# Patient Record
Sex: FEMALE | Race: OTHER | HISPANIC OR LATINO | URBAN - METROPOLITAN AREA
[De-identification: names, ages, dates, MRNs, and addresses within clinical notes are randomized per-mention and may not be internally consistent; named-entity substitution may affect disease eponyms.]

---

## 2021-08-01 ENCOUNTER — EMERGENCY (EMERGENCY)
Facility: HOSPITAL | Age: 21
LOS: 1 days | Discharge: HOME | End: 2021-08-01
Attending: STUDENT IN AN ORGANIZED HEALTH CARE EDUCATION/TRAINING PROGRAM | Admitting: STUDENT IN AN ORGANIZED HEALTH CARE EDUCATION/TRAINING PROGRAM
Payer: MEDICAID

## 2021-08-01 VITALS
OXYGEN SATURATION: 100 % | RESPIRATION RATE: 17 BRPM | DIASTOLIC BLOOD PRESSURE: 73 MMHG | SYSTOLIC BLOOD PRESSURE: 112 MMHG | HEART RATE: 86 BPM | TEMPERATURE: 98 F

## 2021-08-01 VITALS
DIASTOLIC BLOOD PRESSURE: 78 MMHG | HEART RATE: 81 BPM | TEMPERATURE: 98 F | OXYGEN SATURATION: 100 % | SYSTOLIC BLOOD PRESSURE: 118 MMHG | RESPIRATION RATE: 18 BRPM

## 2021-08-01 LAB
ALBUMIN SERPL ELPH-MCNC: 4.5 G/DL — SIGNIFICANT CHANGE UP (ref 3.3–5)
ALP SERPL-CCNC: 99 U/L — SIGNIFICANT CHANGE UP (ref 40–120)
ALT FLD-CCNC: 21 U/L — SIGNIFICANT CHANGE UP (ref 4–33)
ANION GAP SERPL CALC-SCNC: 14 MMOL/L — SIGNIFICANT CHANGE UP (ref 7–14)
AST SERPL-CCNC: 19 U/L — SIGNIFICANT CHANGE UP (ref 4–32)
BASOPHILS # BLD AUTO: 0.04 K/UL — SIGNIFICANT CHANGE UP (ref 0–0.2)
BASOPHILS NFR BLD AUTO: 0.5 % — SIGNIFICANT CHANGE UP (ref 0–2)
BILIRUB SERPL-MCNC: 0.3 MG/DL — SIGNIFICANT CHANGE UP (ref 0.2–1.2)
BUN SERPL-MCNC: 12 MG/DL — SIGNIFICANT CHANGE UP (ref 7–23)
CALCIUM SERPL-MCNC: 9.8 MG/DL — SIGNIFICANT CHANGE UP (ref 8.4–10.5)
CHLORIDE SERPL-SCNC: 102 MMOL/L — SIGNIFICANT CHANGE UP (ref 98–107)
CO2 SERPL-SCNC: 23 MMOL/L — SIGNIFICANT CHANGE UP (ref 22–31)
CREAT SERPL-MCNC: 0.6 MG/DL — SIGNIFICANT CHANGE UP (ref 0.5–1.3)
EOSINOPHIL # BLD AUTO: 0 K/UL — SIGNIFICANT CHANGE UP (ref 0–0.5)
EOSINOPHIL NFR BLD AUTO: 0 % — SIGNIFICANT CHANGE UP (ref 0–6)
GLUCOSE SERPL-MCNC: 104 MG/DL — HIGH (ref 70–99)
HCG SERPL-ACNC: <5 MIU/ML — SIGNIFICANT CHANGE UP
HCT VFR BLD CALC: 41.9 % — SIGNIFICANT CHANGE UP (ref 34.5–45)
HGB BLD-MCNC: 13.9 G/DL — SIGNIFICANT CHANGE UP (ref 11.5–15.5)
IANC: 6.96 K/UL — SIGNIFICANT CHANGE UP (ref 1.5–8.5)
IMM GRANULOCYTES NFR BLD AUTO: 0.6 % — SIGNIFICANT CHANGE UP (ref 0–1.5)
LIDOCAIN IGE QN: 22 U/L — SIGNIFICANT CHANGE UP (ref 7–60)
LYMPHOCYTES # BLD AUTO: 1.39 K/UL — SIGNIFICANT CHANGE UP (ref 1–3.3)
LYMPHOCYTES # BLD AUTO: 15.9 % — SIGNIFICANT CHANGE UP (ref 13–44)
MCHC RBC-ENTMCNC: 29.6 PG — SIGNIFICANT CHANGE UP (ref 27–34)
MCHC RBC-ENTMCNC: 33.2 GM/DL — SIGNIFICANT CHANGE UP (ref 32–36)
MCV RBC AUTO: 89.3 FL — SIGNIFICANT CHANGE UP (ref 80–100)
MONOCYTES # BLD AUTO: 0.28 K/UL — SIGNIFICANT CHANGE UP (ref 0–0.9)
MONOCYTES NFR BLD AUTO: 3.2 % — SIGNIFICANT CHANGE UP (ref 2–14)
NEUTROPHILS # BLD AUTO: 6.96 K/UL — SIGNIFICANT CHANGE UP (ref 1.8–7.4)
NEUTROPHILS NFR BLD AUTO: 79.8 % — HIGH (ref 43–77)
NRBC # BLD: 0 /100 WBCS — SIGNIFICANT CHANGE UP
NRBC # FLD: 0 K/UL — SIGNIFICANT CHANGE UP
PLATELET # BLD AUTO: 186 K/UL — SIGNIFICANT CHANGE UP (ref 150–400)
POTASSIUM SERPL-MCNC: 3.8 MMOL/L — SIGNIFICANT CHANGE UP (ref 3.5–5.3)
POTASSIUM SERPL-SCNC: 3.8 MMOL/L — SIGNIFICANT CHANGE UP (ref 3.5–5.3)
PROT SERPL-MCNC: 7.8 G/DL — SIGNIFICANT CHANGE UP (ref 6–8.3)
RBC # BLD: 4.69 M/UL — SIGNIFICANT CHANGE UP (ref 3.8–5.2)
RBC # FLD: 11.9 % — SIGNIFICANT CHANGE UP (ref 10.3–14.5)
SODIUM SERPL-SCNC: 139 MMOL/L — SIGNIFICANT CHANGE UP (ref 135–145)
WBC # BLD: 8.72 K/UL — SIGNIFICANT CHANGE UP (ref 3.8–10.5)
WBC # FLD AUTO: 8.72 K/UL — SIGNIFICANT CHANGE UP (ref 3.8–10.5)

## 2021-08-01 PROCEDURE — 99284 EMERGENCY DEPT VISIT MOD MDM: CPT

## 2021-08-01 RX ORDER — ONDANSETRON 8 MG/1
4 TABLET, FILM COATED ORAL ONCE
Refills: 0 | Status: COMPLETED | OUTPATIENT
Start: 2021-08-01 | End: 2021-08-01

## 2021-08-01 RX ORDER — FAMOTIDINE 10 MG/ML
20 INJECTION INTRAVENOUS ONCE
Refills: 0 | Status: COMPLETED | OUTPATIENT
Start: 2021-08-01 | End: 2021-08-01

## 2021-08-01 RX ORDER — SODIUM CHLORIDE 9 MG/ML
2000 INJECTION INTRAMUSCULAR; INTRAVENOUS; SUBCUTANEOUS ONCE
Refills: 0 | Status: COMPLETED | OUTPATIENT
Start: 2021-08-01 | End: 2021-08-01

## 2021-08-01 RX ADMIN — FAMOTIDINE 20 MILLIGRAM(S): 10 INJECTION INTRAVENOUS at 12:38

## 2021-08-01 RX ADMIN — ONDANSETRON 4 MILLIGRAM(S): 8 TABLET, FILM COATED ORAL at 12:38

## 2021-08-01 RX ADMIN — SODIUM CHLORIDE 2000 MILLILITER(S): 9 INJECTION INTRAMUSCULAR; INTRAVENOUS; SUBCUTANEOUS at 12:39

## 2021-08-01 NOTE — ED ADULT NURSE NOTE - OBJECTIVE STATEMENT
22 y/o female arrives to E.D. intake area c/o N/V since this AM, pt endorses drinking 4 beers and sangria last night. Pt states she vomitted daya 4 times since this AM. Pt denies chronic alcohol use. Pt a&ox4, ambulatory, neg sob, denies chest pain, denies fevers/cough. R 18g IV placed to AC. Medicated as per eMAR.

## 2021-08-01 NOTE — ED PROVIDER NOTE - PROGRESS NOTE DETAILS
Shameem: Patient give 2L fluids, zofran and pepcid. Feels much better. No n/v at this time. CBC, cmp, lipase unremarkable. Will give PO challenge, if pt can tolerate, stable for discharge. Shameem: Patient vitals stable, PO challenge passed. Stable for dc. Return precautions verbalized. Will follow w/ PMD routinely. Patient agrees with plan.

## 2021-08-01 NOTE — ED PROVIDER NOTE - ATTENDING CONTRIBUTION TO CARE
Doug Tinsley DO:  patient seen and evaluated with the resident.  I was present for key portions of the History & Physical, and I agree with the Impression & Plan. 22 yo f no reported pmh, pw n/v. pt reports excessive etoh intake last night. awoke this morning w/ multiple episodes non-bloody, non-bilious emesis. arrives hds, actively retching. abd soft. no hx abd surgeries. no f/c. passing gas. no urinary sx. low suspicion pancreatitis. likely etoh induced gastritis. will check labs, treat sx, reassess.

## 2021-08-01 NOTE — ED PROVIDER NOTE - OBJECTIVE STATEMENT
Patient is 20 y/o F with no significant PMH who presents to the ED with episodes of nausea and vomiting that began this AM. Patient states last night she went out drinking. Had a variety of drinks including Sangria, beer and soju. Estimated 4 red solo cups of alcohol beverages. When woke up 6 AM had 4-5 episodes of non-bloody, non-bilious emesis. No cough, SOB, abdominal pain or back pain. Denies chest pain or palpitations. Last meal yesterday night. Patient denies binge drinking or chronic alcohol use.

## 2021-08-01 NOTE — ED PROVIDER NOTE - CLINICAL SUMMARY MEDICAL DECISION MAKING FREE TEXT BOX
Patient presenting with episodes of nausea and vomiting since this AM. No chest pain or abdominal pain. No hematemesis. Vitals stable, physical exam without concerning findings. This is likely alcohol use-induced vomiting/velsalgia. Low suspicion for acute pancreatitis. No concern for sequela of repeated vomiting ex. leandro caba tear. Workup: cbc, cmp, lipase, ekg, bhcg. IVF, pecid and zofran for supportive care.

## 2021-08-01 NOTE — ED ADULT NURSE NOTE - NSIMPLEMENTINTERV_GEN_ALL_ED
Implemented All Universal Safety Interventions:  [place] to call system. Call bell, personal items and telephone within reach. Instruct patient to call for assistance. Room bathroom lighting operational. Non-slip footwear when patient is off stretcher. Physically safe environment: no spills, clutter or unnecessary equipment. Stretcher in lowest position, wheels locked, appropriate side rails in place.

## 2021-08-01 NOTE — ED PROVIDER NOTE - NS ED ROS FT
CONSTITUTIONAL: No fevers, no chills, no lightheadedness, no dizziness  EYES: no visual changes, no eye pain  EARS: no ear drainage, no ear pain, no change in hearing  NOSE: no nasal congestion  MOUTH/THROAT: no sore throat  CV: No chest pain, no palpitations  RESP: No SOB, no cough  GI: see hpi   : no dysuria, no hematuria, no flank pain  MSK: no back pain, no extremity pain  SKIN: no rashes  NEURO: no headache, no focal weakness, no decreased sensation/parasthesias   PSYCHIATRIC: no known mental health issues

## 2021-08-01 NOTE — ED PROVIDER NOTE - NSFOLLOWUPINSTRUCTIONS_ED_ALL_ED_FT
You were seen in the Emergency Department today for nausea and vomiting. You were given intravenous fluids, Pepcid and Zofran for your symptoms. Blood work was obtained. Results were discussed with you and a copy will be provided to you. Information regarding nausea and vomiting is provided below. Follow with your primary care provider at your earliest convenience. Take your home medication as prescribed.     Return to the emergency department if:  You see blood in your vomit or your bowel movements.  You have sudden, severe pain in your chest and upper abdomen after hard vomiting or retching.  You have swelling in your neck and chest.  You are dizzy, cold, and thirsty and your eyes and mouth are dry.  You are urinating very little or not at all.  You have muscle weakness, leg cramps, and trouble breathing.  Your heart is beating much faster than normal.  You continue to vomit for more than 48 hours.  Contact your healthcare provider if:  You have frequent dry heaves (vomiting but nothing comes out).  Your nausea and vomiting does not get better or go away after you use medicine.  You have questions or concerns about your condition or treatment.      WHAT YOU NEED TO KNOW:  Acute nausea and vomiting start suddenly, worsen quickly, and last a short time.    DISCHARGE INSTRUCTIONS:    Medicines: You may need any of the following:  Medicines may be given to calm your stomach and stop your vomiting. You may also need medicines to help you feel more relaxed or to stop nausea and vomiting caused by motion sickness.  Gastrointestinal stimulants are used to help empty your stomach and bowels. This may help decrease nausea and vomiting.    Take your medicine as directed. Contact your healthcare provider if you think your medicine is not helping or if you have side effects. Tell him or her if you are allergic to any medicine. Keep a list of the medicines, vitamins, and herbs you take. Include the amounts, and when and why you take them. Bring the list or the pill bottles to follow-up visits. Carry your medicine list with you in case of an emergency.  Prevent or manage acute nausea and vomiting:  Do not drink alcohol. Alcohol may upset or irritate your stomach. Too much alcohol can also cause acute nausea and vomiting.  Control stress. Headaches due to stress may cause nausea and vomiting. Find ways to relax and manage your stress. Get more rest and sleep.  Drink more liquids as directed. Vomiting can lead to dehydration. It is important to drink more liquids to help replace lost body fluids. Ask your healthcare provider how much liquid to drink each day and which liquids are best for you. Your provider may recommend that you drink an oral rehydration solution (ORS). ORS contains water, salts, and sugar that are needed to replace the lost body fluids. Ask what kind of ORS to use, how much to drink, and where to get it.  Eat smaller meals, more often. Eat small amounts of food every 2 to 3 hours, even if you are not hungry. Food in your stomach may decrease your nausea.  Talk to your healthcare provider before you take over-the-counter (OTC) medicines. These medicines can cause serious problems if you use certain other medicines, or you have a medical condition. You may have problems if you use too much or use them for longer than the label says. Follow directions on the label carefully.    Follow up with your healthcare provider as directed: Write down your questions so you remember to ask them during your follow-up visits.

## 2021-08-01 NOTE — ED PROVIDER NOTE - PATIENT PORTAL LINK FT
You can access the FollowMyHealth Patient Portal offered by [facility] by registering at the following website: [email or web address]. By joining [employer] [employer]’s FollowMyHealth portal, you will also be able to view your health information using other applications (apps) compatible with our system.

## 2021-08-01 NOTE — ED PROVIDER NOTE - PHYSICAL EXAMINATION
General: Alert and Orientated x 3. No apparent distress, with emesis bag.   Head: Normocephalic and atraumatic.  Eyes: PERRLA with EOMI.   Neck: Supple. Trachea midline. no crepitus.   Cardiac: Normal S1 and S2 w/ RRR. No murmurs appreciated. No JVD appreciated.  Pulmonary: Vesicular breath sounds bilaterally. No increased WOB. No wheezes or crackles.  Abdominal: Soft, non-tender. (+) bowel sounds appreciated in all 4 quadrants. No hepatosplenomegaly.   Neurologic: No focal sensory or motor deficits. No ataxia, gait normal. Sensation intact. Normal finger to nose and shine to heel. CN 2-12 grossly intact. no tremors.   Musculoskeletal: Strength appropriate in all 4 extremities for age with no limited ROM.  Skin: Color appropriate for race. Intact, warm, and well-perfused.  Psychiatric: Appropriate mood and affect. No apparent risk to self or others.

## 2021-08-20 ENCOUNTER — EMERGENCY (EMERGENCY)
Facility: HOSPITAL | Age: 21
LOS: 1 days | Discharge: HOME | End: 2021-08-20
Attending: EMERGENCY MEDICINE | Admitting: EMERGENCY MEDICINE
Payer: MEDICAID

## 2021-08-20 VITALS
OXYGEN SATURATION: 99 % | TEMPERATURE: 98 F | HEART RATE: 67 BPM | SYSTOLIC BLOOD PRESSURE: 131 MMHG | RESPIRATION RATE: 18 BRPM | DIASTOLIC BLOOD PRESSURE: 70 MMHG

## 2021-08-20 PROBLEM — Z78.9 OTHER SPECIFIED HEALTH STATUS: Chronic | Status: ACTIVE | Noted: 2021-08-01

## 2021-08-20 LAB
ALBUMIN SERPL ELPH-MCNC: 4.2 G/DL — SIGNIFICANT CHANGE UP (ref 3.3–5)
ALP SERPL-CCNC: 81 U/L — SIGNIFICANT CHANGE UP (ref 40–120)
ALT FLD-CCNC: 12 U/L — SIGNIFICANT CHANGE UP (ref 4–33)
ANION GAP SERPL CALC-SCNC: 14 MMOL/L — SIGNIFICANT CHANGE UP (ref 7–14)
AST SERPL-CCNC: 16 U/L — SIGNIFICANT CHANGE UP (ref 4–32)
BASOPHILS # BLD AUTO: 0.05 K/UL — SIGNIFICANT CHANGE UP (ref 0–0.2)
BASOPHILS NFR BLD AUTO: 0.4 % — SIGNIFICANT CHANGE UP (ref 0–2)
BILIRUB SERPL-MCNC: 0.2 MG/DL — SIGNIFICANT CHANGE UP (ref 0.2–1.2)
BUN SERPL-MCNC: 11 MG/DL — SIGNIFICANT CHANGE UP (ref 7–23)
CALCIUM SERPL-MCNC: 9.5 MG/DL — SIGNIFICANT CHANGE UP (ref 8.4–10.5)
CHLORIDE SERPL-SCNC: 106 MMOL/L — SIGNIFICANT CHANGE UP (ref 98–107)
CO2 SERPL-SCNC: 20 MMOL/L — LOW (ref 22–31)
CREAT SERPL-MCNC: 0.63 MG/DL — SIGNIFICANT CHANGE UP (ref 0.5–1.3)
EOSINOPHIL # BLD AUTO: 0.04 K/UL — SIGNIFICANT CHANGE UP (ref 0–0.5)
EOSINOPHIL NFR BLD AUTO: 0.3 % — SIGNIFICANT CHANGE UP (ref 0–6)
GLUCOSE SERPL-MCNC: 113 MG/DL — HIGH (ref 70–99)
HCT VFR BLD CALC: 40.5 % — SIGNIFICANT CHANGE UP (ref 34.5–45)
HGB BLD-MCNC: 13.9 G/DL — SIGNIFICANT CHANGE UP (ref 11.5–15.5)
IANC: 9.52 K/UL — HIGH (ref 1.5–8.5)
IMM GRANULOCYTES NFR BLD AUTO: 0.5 % — SIGNIFICANT CHANGE UP (ref 0–1.5)
LYMPHOCYTES # BLD AUTO: 1.34 K/UL — SIGNIFICANT CHANGE UP (ref 1–3.3)
LYMPHOCYTES # BLD AUTO: 11.6 % — LOW (ref 13–44)
MCHC RBC-ENTMCNC: 29.7 PG — SIGNIFICANT CHANGE UP (ref 27–34)
MCHC RBC-ENTMCNC: 34.3 GM/DL — SIGNIFICANT CHANGE UP (ref 32–36)
MCV RBC AUTO: 86.5 FL — SIGNIFICANT CHANGE UP (ref 80–100)
MONOCYTES # BLD AUTO: 0.54 K/UL — SIGNIFICANT CHANGE UP (ref 0–0.9)
MONOCYTES NFR BLD AUTO: 4.7 % — SIGNIFICANT CHANGE UP (ref 2–14)
NEUTROPHILS # BLD AUTO: 9.52 K/UL — HIGH (ref 1.8–7.4)
NEUTROPHILS NFR BLD AUTO: 82.5 % — HIGH (ref 43–77)
NRBC # BLD: 0 /100 WBCS — SIGNIFICANT CHANGE UP
NRBC # FLD: 0 K/UL — SIGNIFICANT CHANGE UP
PLATELET # BLD AUTO: 178 K/UL — SIGNIFICANT CHANGE UP (ref 150–400)
POTASSIUM SERPL-MCNC: 3.9 MMOL/L — SIGNIFICANT CHANGE UP (ref 3.5–5.3)
POTASSIUM SERPL-SCNC: 3.9 MMOL/L — SIGNIFICANT CHANGE UP (ref 3.5–5.3)
PROT SERPL-MCNC: 7.2 G/DL — SIGNIFICANT CHANGE UP (ref 6–8.3)
RBC # BLD: 4.68 M/UL — SIGNIFICANT CHANGE UP (ref 3.8–5.2)
RBC # FLD: 11.7 % — SIGNIFICANT CHANGE UP (ref 10.3–14.5)
SODIUM SERPL-SCNC: 140 MMOL/L — SIGNIFICANT CHANGE UP (ref 135–145)
WBC # BLD: 11.55 K/UL — HIGH (ref 3.8–10.5)
WBC # FLD AUTO: 11.55 K/UL — HIGH (ref 3.8–10.5)

## 2021-08-20 PROCEDURE — 99284 EMERGENCY DEPT VISIT MOD MDM: CPT

## 2021-08-20 RX ORDER — ONDANSETRON 8 MG/1
1 TABLET, FILM COATED ORAL
Qty: 15 | Refills: 0
Start: 2021-08-20 | End: 2021-08-24

## 2021-08-20 RX ORDER — ACETAMINOPHEN 500 MG
650 TABLET ORAL ONCE
Refills: 0 | Status: COMPLETED | OUTPATIENT
Start: 2021-08-20 | End: 2021-08-20

## 2021-08-20 RX ORDER — SODIUM CHLORIDE 9 MG/ML
1000 INJECTION INTRAMUSCULAR; INTRAVENOUS; SUBCUTANEOUS ONCE
Refills: 0 | Status: COMPLETED | OUTPATIENT
Start: 2021-08-20 | End: 2021-08-20

## 2021-08-20 RX ORDER — ONDANSETRON 8 MG/1
4 TABLET, FILM COATED ORAL ONCE
Refills: 0 | Status: COMPLETED | OUTPATIENT
Start: 2021-08-20 | End: 2021-08-20

## 2021-08-20 RX ADMIN — ONDANSETRON 4 MILLIGRAM(S): 8 TABLET, FILM COATED ORAL at 15:22

## 2021-08-20 RX ADMIN — Medication 650 MILLIGRAM(S): at 15:27

## 2021-08-20 RX ADMIN — SODIUM CHLORIDE 1000 MILLILITER(S): 9 INJECTION INTRAMUSCULAR; INTRAVENOUS; SUBCUTANEOUS at 15:22

## 2021-08-20 NOTE — ED ADULT NURSE NOTE - OBJECTIVE STATEMENT
Pt presents to intake rm 8, alery&orientedx4, ambulatory, denies PMHX coming to ED for nausea and vomiting that started today. Pt states it was her second day using "period cup", left over night and woke up started vomiting and endorsing abd pain. NAD noted, no abnormal heaving bleeding, breathing non-labored. 20g IV established on right ac, labs drawn and sent, medications given as ordered.

## 2021-08-20 NOTE — ED PROVIDER NOTE - PHYSICAL EXAMINATION
Gen: NAD, AOx3, able to make needs known, non-toxic  Head: NCAT  HEENT: EOMI, normal conjunctiva  Lung: CTAB, no respiratory distress, no wheezes/rhonchi/rales B/L, speaking in full sentences  CV: RRR, no M/R/G, pulses bilaterally   Abd: soft, NTND, no guarding, no CVA tenderness  MSK: no visible deformities  Pelvic:   Neuro: No focal sensory or motor deficits  Skin: Warm, well perfused, no rash  Psych: normal affect Gen: NAD, AOx3, able to make needs known, non-toxic  Head: NCAT  HEENT: EOMI, normal conjunctiva  Lung: CTAB, no respiratory distress, no wheezes/rhonchi/rales B/L, speaking in full sentences  CV: RRR, no M/R/G, pulses bilaterally   Abd: soft, NTND, no guarding, no CVA tenderness  MSK: no visible deformities  Neuro: No focal sensory or motor deficits  Skin: Warm, well perfused, no rash  Psych: normal affect

## 2021-08-20 NOTE — ED ADULT NURSE NOTE - NSIMPLEMENTINTERV_GEN_ALL_ED
Implemented All Fall Risk Interventions:  [place] to call system. Call bell, personal items and telephone within reach. Instruct patient to call for assistance. Room bathroom lighting operational. Non-slip footwear when patient is off stretcher. Physically safe environment: no spills, clutter or unnecessary equipment. Stretcher in lowest position, wheels locked, appropriate side rails in place. Provide visual cue, wrist band, yellow gown, etc. Monitor gait and stability. Monitor for mental status changes and reorient to person, place, and time. Review medications for side effects contributing to fall risk. Reinforce activity limits and safety measures with patient and family.

## 2021-08-20 NOTE — ED PROVIDER NOTE - PATIENT PORTAL LINK FT
You can access the FollowMyHealth Patient Portal offered by [facility] by registering at the following website: [email or web address]. By joining [employer]’s FollowMyHealth portal, you will also be able to view your health information using other applications (apps) compatible with our system.

## 2021-08-20 NOTE — ED PROVIDER NOTE - OBJECTIVE STATEMENT
20yo woman with no PMH presenting with vomitingx3 and diarrhea 2 hours ago. Patient used period ring for the first time last night, awoke this morning and changed it after having it on for ~15 hours. At noon all of a sudden started vomiting and having diarrhea at the same time. Patient states she has some abdominal pain and feels weak in her legs. Denies fever, recent sickness. 22yo woman with no PMH presenting with vomitingx3 and diarrhea 2 hours ago. Patient used period ring for the first time last night, awoke this morning and changed it after having it on for ~15 hours. Felt nauseas and took the period ring out. At noon all of a sudden started vomiting and having diarrhea at the same time. Patient states she has some abdominal pain and feels weak in her legs. Denies sexual intercourse, any foreign bodies currently still in the vagina. Denies fever, recent sickness. 22yo woman with no PMH presenting with vomitingx3 and diarrhea 2 hours ago. Patient used period ring for the first time last night, awoke this morning and changed it after having it on for ~15 hours. Felt nauseas and took the period ring out (put in 2, took out 2). At noon all of a sudden started vomiting and having diarrhea at the same time. Patient states she has some abdominal pain and feels weak in her legs. Denies sexual intercourse, any foreign bodies currently still in the vagina. Denies fever, recent sickness.

## 2021-08-20 NOTE — ED PROVIDER NOTE - ATTENDING CONTRIBUTION TO CARE
DR. LEACH, ATTENDING MD-  I performed a face to face bedside interview with the patient regarding history of present illness, review of symptoms and past medical history. I completed an independent physical exam.  I have discussed the patient's plan of care with the resident.   Documentation as above in the note.    20 y/o female with n/v/d x1 hour pta.  No mj use.  States never sexually active.  Used a "period ring" twice for the first time during this menstrual period.  Once last night and once this morning.  States she placed two and retrieved two.  Likely viral syndrome vs foodborne illness.  Eval for pregnancy, lyte abn.  Obtain ucg cbc cmp give ivf antiemetic reassess, will need to pass po trial prior to dc.

## 2021-08-20 NOTE — ED PROVIDER NOTE - CLINICAL SUMMARY MEDICAL DECISION MAKING FREE TEXT BOX
Tyler, PGY1 - 20yo woman with vomiting and diarrhea at the same time. CBC, CMP, Zofran. Suspicious for gastroenteritis. Tyler, PGY1 - 20yo woman with vomiting and diarrhea at the same time. CBC, CMP, Zofran. Suspicious for gastroenteritis. [facility], decreased suspicion for [facility] given sexual history. Tyler, PGY1 - 20yo woman with vomiting and diarrhea at the same time. CBC, CMP, Zofran. Suspicious for gastroenteritis. Will get POC hcg, decreased suspicion for pregnancy given sexual history.

## 2021-08-20 NOTE — ED ADULT NURSE NOTE - CHIEF COMPLAINT QUOTE
Patient brought to ER by her brother because she had a period ring in for more than the 12 hour period. (Overnight) and an hour ago she started violently vomiting and diarrhea.  Brother Duarte: [phone]

## 2021-08-20 NOTE — ED ADULT TRIAGE NOTE - CHIEF COMPLAINT QUOTE
Patient brought to ER by her brother because she had a period ring in for more than the 12 hour period. (Overnight) and an hour ago she started violently vomiting and diarrhea. Patient brought to ER by her brother because she had a period ring in for more than the 12 hour period. (Overnight) and an hour ago she started violently vomiting and diarrhea.  Brother Duarte: [phone]

## 2021-08-20 NOTE — ED PROVIDER NOTE - NS ED ROS FT
GENERAL: No fever, no chills  EYES: No change in vision  HEENT: No trouble swallowing or speaking  CARDIAC: No chest pain  PULMONARY: No cough, no SOB  GI: +abdominal pain, no nausea, +vomiting, no diarrhea, no constipation  : No changes in urination  SKIN: No rashes  NEURO: No headache, no numbness  MSK: No joint pain  Otherwise as HPI or negative.

## 2021-08-20 NOTE — ED PROVIDER NOTE - NSFOLLOWUPINSTRUCTIONS_ED_ALL_ED_FT
You were seen in the Emergency Room today because of vomiting and diarrhea. Your blood tests showed a small increase in your white blood cells, which can be due to inflammation or infection. These results, and your improvement in symptoms with pain medicine and anti-nausea medication is reassuring that this is due to a viral syndrome of your intestines.     Your lab and imaging results are included in your discharge paperwork. Please follow-up with your Primary Care Physician for further management of your symptoms.     We have sent medication to your pharmacy. It is called "[employer]an. Please take one pill -----.       ----------    Gastroenteritis    WHAT YOU NEED TO KNOW:  Gastroenteritis, or stomach flu, is an infection of the stomach and intestines.     DISCHARGE INSTRUCTIONS:    Please come back to the Emergency Room if:   •You have trouble breathing or a very fast pulse.  •You see blood in your diarrhea.  •You cannot stop vomiting.  •You have not urinated for 12 hours.   •You faint.     Contact your healthcare provider if:   •You have a fever.  •You continue to vomit or have diarrhea, even after treatment.  •You see worms in your diarrhea.  •Your mouth or eyes are dry. You are not urinating as much or as often.  •You have questions or concerns about your condition or care.      Medicines:   •Medicines may be given to stop vomiting or diarrhea, decrease abdominal cramps, or treat an infection.  •Take your medicine as directed. Contact your healthcare provider if you think your medicine is not helping or if you have side effects. Tell him or her if you are allergic to any medicine. Keep a list of the medicines, vitamins, and herbs you take. Include the amounts, and when and why you take them. Bring the list or the pill bottles to follow-up visits. Carry your medicine list with you in case of an emergency.      Manage your symptoms:   •Drink liquids as directed. Ask your healthcare provider how much liquid to drink each day, and which liquids are best for you. You may also need to drink an oral rehydration solution (ORS). An ORS has the right amounts of sugar, salt, and minerals in water to replace body fluids.  •Eat bland foods. When you feel hungry, begin eating soft, bland foods. Examples are bananas, clear soup, potatoes, and applesauce. Do not have dairy products, alcohol, sugary drinks, or drinks with caffeine until you feel better.  •Rest as much as possible. Slowly start to do more each day when you begin to feel better.      Prevent the spread of gastroenteritis: Gastroenteritis can spread easily. Keep yourself, your family, and your surroundings clean to help prevent the spread of gastroenteritis:   •Wash your hands often. Use soap and water. Wash your hands after you use the bathroom, change a child's diapers, or sneeze. Wash your hands before you prepare or eat food.   •Clean surfaces and do laundry often. Wash your clothes and towels separately from the rest of the laundry. Clean surfaces in your home with antibacterial  or bleach.  •Clean food thoroughly and cook safely. Wash raw vegetables before you cook. Cook meat, fish, and eggs fully. Do not use the same dishes for raw meat as you do for other foods. Refrigerate any leftover food immediately.  •Be aware when you camp or travel. Drink only clean water. Do not drink from rivers or lakes unless you purify or boil the water first. When you travel, drink bottled water and do not add ice. Do not eat fruit that has not been peeled. Do not eat raw fish or meat that is not fully cooked.       Follow up with your doctor as directed: Write down your questions so you remember to ask them during your visits. You were seen in the Emergency Room today because of vomiting and diarrhea. Your blood tests showed a small increase in your white blood cells, which can be due to inflammation or infection. These results, and your improvement in symptoms with pain medicine and anti-nausea medication is reassuring that this is due to a viral syndrome of your intestines.     Your lab and imaging results are included in your discharge paperwork. Please follow-up with your Primary Care Physician for further management of your symptoms.     We have sent medication to your pharmacy. It is called [employer]. Please take one pill every 8 hours for 5 days.       ----------    Gastroenteritis    WHAT YOU NEED TO KNOW:  Gastroenteritis, or stomach flu, is an infection of the stomach and intestines.     DISCHARGE INSTRUCTIONS:    Please come back to the Emergency Room if:   •You have trouble breathing or a very fast pulse.  •You see blood in your diarrhea.  •You cannot stop vomiting.  •You have not urinated for 12 hours.   •You faint.     Contact your healthcare provider if:   •You have a fever.  •You continue to vomit or have diarrhea, even after treatment.  •You see worms in your diarrhea.  •Your mouth or eyes are dry. You are not urinating as much or as often.  •You have questions or concerns about your condition or care.      Medicines:   •Take your medicine as directed. Contact your healthcare provider if you think your medicine is not helping or if you have side effects. Tell him or her if you are allergic to any medicine.       Manage your symptoms:   •Drink a lot of liquid during the day.   •Eat bland foods. When you feel hungry, begin eating soft, bland foods. Examples are bananas, clear soup, potatoes, and applesauce. Do not have dairy products, alcohol, sugary drinks, or drinks with caffeine until you feel better.  •Rest as much as possible. Slowly start to do more each day when you begin to feel better.      Prevent the spread of gastroenteritis: Gastroenteritis can spread easily. Keep yourself, your family, and your surroundings clean to help prevent the spread of gastroenteritis:   •Wash your hands often. Use soap and water.   •Clean surfaces and do laundry often.   •Clean food thoroughly and cook safely. Wash raw vegetables before you cook. Cook meat, fish, and eggs fully. Do not use the same dishes for raw meat as you do for other foods. Refrigerate any leftover food immediately.  •Be aware when you camp or travel. Drink only clean water. Do not drink from rivers or lakes unless you purify or boil the water first. When you travel, drink bottled water and do not add ice. Do not eat fruit that has not been peeled. Do not eat raw fish or meat that is not fully cooked.       Follow up with your doctor as directed: Write down your questions so you remember to ask them during your visits. You were seen in the Emergency Room today because of vomiting and diarrhea. Your blood tests showed a small increase in your white blood cells, which can be due to inflammation or infection. These results, and your improvement in symptoms with pain medicine and anti-nausea medication is reassuring that this is due to a viral syndrome of your intestines.     Your lab and imaging results are included in your discharge paperwork. Please follow-up with your Primary Care Physician for further management of your symptoms if they continue.     We have sent medication to your pharmacy. It is called Zofran. Please take one pill every 8 hours if you need to for nausea.        ----------    Gastroenteritis    WHAT YOU NEED TO KNOW:  Gastroenteritis, or stomach flu, is an infection of the stomach and intestines.     DISCHARGE INSTRUCTIONS:    Please come back to the Emergency Room if:   •You have trouble breathing or a very fast pulse.  •You see blood in your diarrhea.  •You cannot stop vomiting.  •You have not urinated for 12 hours.   •You faint.     Contact your healthcare provider if:   •You have a fever.  •You continue to vomit or have diarrhea, even after treatment.  •You see worms in your diarrhea.  •Your mouth or eyes are dry. You are not urinating as much or as often.  •You have questions or concerns about your condition or care.      Medicines:   •Take your medicine as directed. Contact your healthcare provider if you think your medicine is not helping or if you have side effects. Tell him or her if you are allergic to any medicine.       Manage your symptoms:   •Drink a lot of liquid during the day.   •Eat bland foods. When you feel hungry, begin eating soft, bland foods. Examples are bananas, clear soup, potatoes, and applesauce. Do not have dairy products, alcohol, sugary drinks, or drinks with caffeine until you feel better.  •Rest as much as possible. Slowly start to do more each day when you begin to feel better.      Prevent the spread of gastroenteritis: Gastroenteritis can spread easily. Keep yourself, your family, and your surroundings clean to help prevent the spread of gastroenteritis:   •Wash your hands often. Use soap and water.   •Clean surfaces and do laundry often.   •Clean food thoroughly and cook safely. Wash raw vegetables before you cook. Cook meat, fish, and eggs fully. Do not use the same dishes for raw meat as you do for other foods. Refrigerate any leftover food immediately.  •Be aware when you camp or travel. Drink only clean water. Do not drink from rivers or lakes unless you purify or boil the water first. When you travel, drink bottled water and do not add ice. Do not eat fruit that has not been peeled. Do not eat raw fish or meat that is not fully cooked.       Follow up with your doctor as directed: Write down your questions so you remember to ask them during your visits.

## 2021-08-20 NOTE — ED PROVIDER NOTE - PROGRESS NOTE DETAILS
Tyler, PGY1 - Patient feeling much better. Almost at baseline. UA results pending. Tyler, PGY1 - Passed PO trial. Patient stable for discharge. Understands the Emergency Room work-up and discharge precautions.

## 2021-08-20 NOTE — ED ADULT TRIAGE NOTE - DOMESTIC TRAVEL HIGH RISK QUESTION
"[facility] OB Triage    Subjective: Theresa Estes is a  33 y.o. female at 28w3d with an uncomplicated prenatal history who presents to OB triage with vomiting and contractions.  Patient reports that she started to feel nauseated last night before bed, so she took some tums to help calm her stomach down.  She went to bed, woke up around 0400, and vomited.  After that first episode of emesis, the patient felt a uterine contraction.  She then vomited two more times throughout the course of the morning, and after the third episode of emesis the patient noted that the contractions started coming more regularly.  She timed them about 10-15 minutes apart.  She tried to eat/drink to stay hydrated, but was unable to keep that down.  She called her PCP, who instructed her to come in.  She has had two more episodes of emesis since then, for a total of 5 episodes of emesis.  Patient denies any blood in the vomit.  She denies diarrhea, and had two normal bowel movements earlier today.  She started to have some mild low back pain later in the morning.  She denies any fever, vision change, swelling in hands or feet, vaginal bleeding, leaking fluid.  Fetal movement has been normal.  This is her first pregnancy, so no history of pre-term labor.      Estimated Date of Delivery: 17 No LMP recorded. Patient is pregnant.  OB provider: Gallito Pelletier DO  OB History      Para Term  AB TAB SAB Ectopic Multiple Living    1                   Objective:   Temperature 98.4  F (36.9  C), height 5' 4\" (1.626 m), weight 196 lb (88.9 kg).  General:   alert, appears stated age, cooperative and appears tired and ill   Skin:   hot water burn on right hand   HEENT:  PERRLA, extra ocular movement intact, sclera clear, anicteric, oropharynx clear, no lesions and neck supple with midline trachea   Lungs:   clear to auscultation bilaterally   Heart:   regular rate and rhythm, S1, S2 normal, no murmur, click, rub or " No gallop   Extremities: Warm, dry and well perfused. No edema.   Abdomen: FHT present   Membranes intact   [place]:  Irritability with occasional contractions   FHT: Baseline: 150 bpm, Variability: Moderate (6 - 25 bpm), Accelerations: Present and Decelerations: Absent     Laboratory Studies:   Results for orders placed or performed during the hospital encounter of 17   Urinalysis-UC if Indicated   Result Value Ref Range    Color, UA Yellow Colorless, Yellow, Straw, Light Yellow    Clarity, UA Cloudy (!) Clear    Glucose, UA Negative Negative    Bilirubin, UA Negative Negative    Ketones, UA Trace (!) Negative    Specific Gravity, UA 1.022 1.001 - 1.030    Blood, UA Negative Negative    pH, UA 6.0 4.5 - 8.0    Protein, UA Trace (!) Negative mg/dL    Urobilinogen, UA <2.0 E.U./dL <2.0 E.U./dL, 2.0 E.U./dL    Nitrite, UA Negative Negative    Leukocytes, UA Negative Negative    Bacteria, UA Moderate (!) None Seen hpf    RBC, UA 0-2 None Seen, 0-2 hpf    WBC, UA 0-5 None Seen, 0-5 hpf    Squam Epithel, UA 25-50 (!) None Seen, 0-5 lpf    Mucus, UA Few (!) None Seen lpf        ASSESSMENT AND PLAN: Theresa Estes is a  33 y.o. female who presented to [facility] at 28w3d on 2017 with nausea, vomiting, and contractions.  She woke up this morning with vomiting, and then started to have intermittent contractions afterward.  Patient mildly tachycardic, positive urine ketones, and more concentrated urine indicating dehydration. This is likely the etiology of her contractions.  Patient given 1 liter bolus in triage, with moderate relief in symptoms.   [place] shows occasional contractions, with some periods of regular contractions every 5-10 minutes.  Patient reports these contractions are mild.  Category 1 strip.  Since still having contractions after first liter of fluid, new plan as follows.  1. Zofran 4 mg IV for nausea  2. Second liter of fluids, administered at 125 mL/hr  3. Encourage oral  rehydration  4. No cervical checks at this time, in the event that we order FFN   5. Will monitor in triage until second fluid bolus is complete  6. Will update Dr. Knapp at that time.    Patient dicussed with attending physician, Dr.Mary Knapp, who agrees with the plan.      Bela Torres PGY2 2/19/2017  [facility]

## 2023-01-07 ENCOUNTER — EMERGENCY (EMERGENCY)
Facility: HOSPITAL | Age: 23
LOS: 1 days | Discharge: HOME | End: 2023-01-07
Attending: STUDENT IN AN ORGANIZED HEALTH CARE EDUCATION/TRAINING PROGRAM | Admitting: EMERGENCY MEDICINE
Payer: MEDICAID

## 2023-01-07 VITALS
OXYGEN SATURATION: 100 % | DIASTOLIC BLOOD PRESSURE: 96 MMHG | HEART RATE: 94 BPM | SYSTOLIC BLOOD PRESSURE: 144 MMHG | RESPIRATION RATE: 16 BRPM | TEMPERATURE: 98 F

## 2023-01-07 LAB
ALBUMIN SERPL ELPH-MCNC: 4.2 G/DL — SIGNIFICANT CHANGE UP (ref 3.3–5)
ALP SERPL-CCNC: 69 U/L — SIGNIFICANT CHANGE UP (ref 40–120)
ALT FLD-CCNC: 10 U/L — SIGNIFICANT CHANGE UP (ref 4–33)
ANION GAP SERPL CALC-SCNC: 12 MMOL/L — SIGNIFICANT CHANGE UP (ref 7–14)
AST SERPL-CCNC: 15 U/L — SIGNIFICANT CHANGE UP (ref 4–32)
B PERT DNA SPEC QL NAA+PROBE: SIGNIFICANT CHANGE UP
B PERT+PARAPERT DNA PNL SPEC NAA+PROBE: SIGNIFICANT CHANGE UP
BASOPHILS # BLD AUTO: 0.02 K/UL — SIGNIFICANT CHANGE UP (ref 0–0.2)
BASOPHILS NFR BLD AUTO: 0.3 % — SIGNIFICANT CHANGE UP (ref 0–2)
BILIRUB SERPL-MCNC: 0.4 MG/DL — SIGNIFICANT CHANGE UP (ref 0.2–1.2)
BORDETELLA PARAPERTUSSIS (RAPRVP): SIGNIFICANT CHANGE UP
BUN SERPL-MCNC: 10 MG/DL — SIGNIFICANT CHANGE UP (ref 7–23)
C PNEUM DNA SPEC QL NAA+PROBE: SIGNIFICANT CHANGE UP
CALCIUM SERPL-MCNC: 9.1 MG/DL — SIGNIFICANT CHANGE UP (ref 8.4–10.5)
CHLORIDE SERPL-SCNC: 96 MMOL/L — LOW (ref 98–107)
CO2 SERPL-SCNC: 27 MMOL/L — SIGNIFICANT CHANGE UP (ref 22–31)
CREAT SERPL-MCNC: 0.66 MG/DL — SIGNIFICANT CHANGE UP (ref 0.5–1.3)
EGFR: 126 ML/MIN/1.73M2 — SIGNIFICANT CHANGE UP
EOSINOPHIL # BLD AUTO: 0 K/UL — SIGNIFICANT CHANGE UP (ref 0–0.5)
EOSINOPHIL NFR BLD AUTO: 0 % — SIGNIFICANT CHANGE UP (ref 0–6)
FLUAV SUBTYP SPEC NAA+PROBE: SIGNIFICANT CHANGE UP
FLUBV RNA SPEC QL NAA+PROBE: SIGNIFICANT CHANGE UP
GLUCOSE SERPL-MCNC: 95 MG/DL — SIGNIFICANT CHANGE UP (ref 70–99)
HADV DNA SPEC QL NAA+PROBE: SIGNIFICANT CHANGE UP
HCOV 229E RNA SPEC QL NAA+PROBE: SIGNIFICANT CHANGE UP
HCOV HKU1 RNA SPEC QL NAA+PROBE: SIGNIFICANT CHANGE UP
HCOV NL63 RNA SPEC QL NAA+PROBE: SIGNIFICANT CHANGE UP
HCOV OC43 RNA SPEC QL NAA+PROBE: SIGNIFICANT CHANGE UP
HCT VFR BLD CALC: 40.9 % — SIGNIFICANT CHANGE UP (ref 34.5–45)
HGB BLD-MCNC: 13.8 G/DL — SIGNIFICANT CHANGE UP (ref 11.5–15.5)
HMPV RNA SPEC QL NAA+PROBE: SIGNIFICANT CHANGE UP
HPIV1 RNA SPEC QL NAA+PROBE: SIGNIFICANT CHANGE UP
HPIV2 RNA SPEC QL NAA+PROBE: SIGNIFICANT CHANGE UP
HPIV3 RNA SPEC QL NAA+PROBE: SIGNIFICANT CHANGE UP
HPIV4 RNA SPEC QL NAA+PROBE: SIGNIFICANT CHANGE UP
IANC: 4.16 K/UL — SIGNIFICANT CHANGE UP (ref 1.8–7.4)
IMM GRANULOCYTES NFR BLD AUTO: 0.3 % — SIGNIFICANT CHANGE UP (ref 0–0.9)
LIDOCAIN IGE QN: 21 U/L — SIGNIFICANT CHANGE UP (ref 7–60)
LYMPHOCYTES # BLD AUTO: 1.65 K/UL — SIGNIFICANT CHANGE UP (ref 1–3.3)
LYMPHOCYTES # BLD AUTO: 24.6 % — SIGNIFICANT CHANGE UP (ref 13–44)
M PNEUMO DNA SPEC QL NAA+PROBE: SIGNIFICANT CHANGE UP
MCHC RBC-ENTMCNC: 28.9 PG — SIGNIFICANT CHANGE UP (ref 27–34)
MCHC RBC-ENTMCNC: 33.7 GM/DL — SIGNIFICANT CHANGE UP (ref 32–36)
MCV RBC AUTO: 85.7 FL — SIGNIFICANT CHANGE UP (ref 80–100)
MONOCYTES # BLD AUTO: 0.86 K/UL — SIGNIFICANT CHANGE UP (ref 0–0.9)
MONOCYTES NFR BLD AUTO: 12.8 % — SIGNIFICANT CHANGE UP (ref 2–14)
NEUTROPHILS # BLD AUTO: 4.16 K/UL — SIGNIFICANT CHANGE UP (ref 1.8–7.4)
NEUTROPHILS NFR BLD AUTO: 62 % — SIGNIFICANT CHANGE UP (ref 43–77)
NRBC # BLD: 0 /100 WBCS — SIGNIFICANT CHANGE UP (ref 0–0)
NRBC # FLD: 0 K/UL — SIGNIFICANT CHANGE UP (ref 0–0)
PLATELET # BLD AUTO: 165 K/UL — SIGNIFICANT CHANGE UP (ref 150–400)
POTASSIUM SERPL-MCNC: 3.4 MMOL/L — LOW (ref 3.5–5.3)
POTASSIUM SERPL-SCNC: 3.4 MMOL/L — LOW (ref 3.5–5.3)
PROT SERPL-MCNC: 7.4 G/DL — SIGNIFICANT CHANGE UP (ref 6–8.3)
RAPID RVP RESULT: SIGNIFICANT CHANGE UP
RBC # BLD: 4.77 M/UL — SIGNIFICANT CHANGE UP (ref 3.8–5.2)
RBC # FLD: 12.1 % — SIGNIFICANT CHANGE UP (ref 10.3–14.5)
RSV RNA SPEC QL NAA+PROBE: SIGNIFICANT CHANGE UP
RV+EV RNA SPEC QL NAA+PROBE: SIGNIFICANT CHANGE UP
SARS-COV-2 RNA SPEC QL NAA+PROBE: SIGNIFICANT CHANGE UP
SODIUM SERPL-SCNC: 135 MMOL/L — SIGNIFICANT CHANGE UP (ref 135–145)
TROPONIN T, HIGH SENSITIVITY RESULT: <6 NG/L — SIGNIFICANT CHANGE UP
WBC # BLD: 6.71 K/UL — SIGNIFICANT CHANGE UP (ref 3.8–10.5)
WBC # FLD AUTO: 6.71 K/UL — SIGNIFICANT CHANGE UP (ref 3.8–10.5)

## 2023-01-07 PROCEDURE — 99285 EMERGENCY DEPT VISIT HI MDM: CPT

## 2023-01-07 PROCEDURE — 71046 X-RAY EXAM CHEST 2 VIEWS: CPT | Mod: 26

## 2023-01-07 RX ORDER — KETOROLAC TROMETHAMINE 30 MG/ML
30 SYRINGE (ML) INJECTION ONCE
Refills: 0 | Status: DISCONTINUED | OUTPATIENT
Start: 2023-01-07 | End: 2023-01-07

## 2023-01-07 RX ORDER — FAMOTIDINE 10 MG/ML
20 INJECTION INTRAVENOUS ONCE
Refills: 0 | Status: COMPLETED | OUTPATIENT
Start: 2023-01-07 | End: 2023-01-07

## 2023-01-07 RX ORDER — SODIUM CHLORIDE 9 MG/ML
1000 INJECTION INTRAMUSCULAR; INTRAVENOUS; SUBCUTANEOUS ONCE
Refills: 0 | Status: COMPLETED | OUTPATIENT
Start: 2023-01-07 | End: 2023-01-07

## 2023-01-07 RX ORDER — POTASSIUM CHLORIDE 20 MEQ
40 PACKET (EA) ORAL ONCE
Refills: 0 | Status: COMPLETED | OUTPATIENT
Start: 2023-01-07 | End: 2023-01-07

## 2023-01-07 RX ADMIN — FAMOTIDINE 20 MILLIGRAM(S): 10 INJECTION INTRAVENOUS at 13:39

## 2023-01-07 RX ADMIN — Medication 30 MILLIGRAM(S): at 13:38

## 2023-01-07 RX ADMIN — SODIUM CHLORIDE 1000 MILLILITER(S): 9 INJECTION INTRAMUSCULAR; INTRAVENOUS; SUBCUTANEOUS at 13:39

## 2023-01-07 RX ADMIN — Medication 40 MILLIEQUIVALENT(S): at 16:11

## 2023-01-07 NOTE — ED PROVIDER NOTE - CLINICAL SUMMARY MEDICAL DECISION MAKING FREE TEXT BOX
22-year-old female with no past medical history presents to ED complaining of generalized weakness and left-sided chest pain.  Patient states she was sick the last 3 days with nausea vomiting and diarrhea feels better today other than having worsening generalized weakness.  Patient complaining of severe left-sided chest pain that has been going on for 3 days and started after vomiting.  pt with chest wall ttp. abd soft and NT. likely viral syndrome. concern for dehydration and costochondritis secondary to vomiting. plan to check labs, ucg, nsaids, hydrate, cxr reassess

## 2023-01-07 NOTE — ED PROVIDER NOTE - NSFOLLOWUPINSTRUCTIONS_ED_ALL_ED_FT
Take Motrin 600mg every 8hrs with food for pain. Warm compresses.    Follow up with your PMD within 48-72 hours.  Rest, increase fluids.     Worsening, continued or new concerning symptoms return to the emergency department.

## 2023-01-07 NOTE — ED ADULT NURSE NOTE - OBJECTIVE STATEMENT
pt A&ox4, coming to ED from home for abdominal pain and nausea/ vomiting that started on thursday. pt states she also feels left arm pain associated with epigastric pain. pt denies pmh. at this time denies n/v/d. denies fever/chills at home. pt denies Chest pain and SOB. pt denies H/A, Dizziness, lightheadedness, and radiating chest pain. breathing is spontaneous and unlabored. sating 99% on RA. left ac 20g IV placed Labs drawn and sent as per ordered. Bed in lowest position, call bell within reach, all other safety and comfort measures provided. awaiting labs results and further orders.

## 2023-01-07 NOTE — ED PROVIDER NOTE - ATTENDING APP SHARED VISIT CONTRIBUTION OF CARE
I, Niall Gallardo, performed a history and physical exam of the patient and discussed their management with the resident and /or advanced care provider. I reviewed the resident and /or ACP's note and agree with the documented findings and plan of care. I was present and available for all procedures.    Niall Gallardo MD: 22-year-old female with no significant PMH presents with few days of nausea/vomiting/diarrhea with a known sick contact, stating that her boyfriend are very similar symptoms.  On presentation to the ED patient states that he does not have any fevers, chills, cough, sore throat and that the diarrhea has improved but continues to have some slight diarrhea.    On exam patient without any significant abdominal tenderness to palpation, nontoxic-appearing and in no acute distress.  Presentation seem secondary to viral illness with gastroenteritis, patient did endorse some mild chest pain that could be secondary from the vomiting, will get x-ray to evaluate for perforation or mediastinal air, low clinical suspicion for ACS.  Labs remarkable for negative troponin, lipase of 21 and no transaminitis.    On reevaluation patient resting marked improvement in symptoms and states that she has no chest pain or abdominal pain and was p.o. challenged and was able to tolerate p.o. without issue.  We will send patient home with some Zofran to get strict return precautions.  CXR negative for acute cardiopulmonary process.  Patient eager for discharge.

## 2023-01-07 NOTE — ED ADULT NURSE NOTE - CAS EDP DISCH TYPE
conducted a detailed discussion... I had a detailed discussion with the patient and/or guardian regarding the historical points, exam findings, and any diagnostic results supporting the discharge/admit diagnosis. Home

## 2023-01-07 NOTE — ED PROVIDER NOTE - NS ED ATTENDING STATEMENT MOD
This was a shared visit with the FAIZA. I reviewed and verified the documentation and independently performed the documented:

## 2023-01-07 NOTE — ED PROVIDER NOTE - PATIENT PORTAL LINK FT
You can access the FollowMyHealth Patient Portal offered by [facility] by registering at the following website: [email or web address]. By joining [employer]’s FollowMyHealth portal, you will also be able to view your health information using other applications (apps) compatible with our system.

## 2023-01-07 NOTE — ED PROVIDER NOTE - OBJECTIVE STATEMENT
22-year-old female with no past medical history presents to ED complaining of generalized weakness and left-sided chest pain.  Patient states she was sick the last 3 days with nausea vomiting and diarrhea feels better today other than having worsening generalized weakness.  Patient complaining of severe left-sided chest pain that has been going on for 3 days and started after vomiting.  Pain radiates down her left arm.  Denies any fevers, chills, shortness of breath, cough, sore throat, abdominal pain.  Patient's fiancé with similar symptoms days ago after eating Chinese food.

## 2023-01-07 NOTE — ED ADULT TRIAGE NOTE - CHIEF COMPLAINT QUOTE
jorge states" she is weak with abdominal pain and vomiting with diarrhea" states he had same symptoms since Tuesday till Thursday .patient is tearful in triage

## 2023-04-11 NOTE — ED PROVIDER NOTE - MDM ORDERS SUBMITTED SELECTION
PLOF is limited 2/2 Pt's current mental status. As per notes, pt has been indpendent prior to admission. Labs/Medications

## 2023-09-06 ENCOUNTER — EMERGENCY (EMERGENCY)
Facility: HOSPITAL | Age: 23
LOS: 1 days | Discharge: HOME | End: 2023-09-06
Admitting: EMERGENCY MEDICINE
Payer: MEDICAID

## 2023-09-06 VITALS
DIASTOLIC BLOOD PRESSURE: 90 MMHG | RESPIRATION RATE: 16 BRPM | SYSTOLIC BLOOD PRESSURE: 150 MMHG | OXYGEN SATURATION: 99 % | TEMPERATURE: 100 F | HEART RATE: 110 BPM

## 2023-09-06 VITALS
TEMPERATURE: 98 F | SYSTOLIC BLOOD PRESSURE: 132 MMHG | RESPIRATION RATE: 16 BRPM | DIASTOLIC BLOOD PRESSURE: 87 MMHG | HEART RATE: 93 BPM | OXYGEN SATURATION: 98 %

## 2023-09-06 LAB
APPEARANCE UR: CLEAR — SIGNIFICANT CHANGE UP
BACTERIA # UR AUTO: NEGATIVE /HPF — SIGNIFICANT CHANGE UP
BILIRUB UR-MCNC: NEGATIVE — SIGNIFICANT CHANGE UP
CAST: 0 /LPF — SIGNIFICANT CHANGE UP (ref 0–4)
COLOR SPEC: YELLOW — SIGNIFICANT CHANGE UP
DIFF PNL FLD: NEGATIVE — SIGNIFICANT CHANGE UP
FLUAV AG NPH QL: SIGNIFICANT CHANGE UP
FLUBV AG NPH QL: SIGNIFICANT CHANGE UP
GLUCOSE UR QL: NEGATIVE MG/DL — SIGNIFICANT CHANGE UP
KETONES UR-MCNC: 15 MG/DL
LEUKOCYTE ESTERASE UR-ACNC: ABNORMAL
NITRITE UR-MCNC: NEGATIVE — SIGNIFICANT CHANGE UP
PH UR: 6.5 — SIGNIFICANT CHANGE UP (ref 5–8)
PROT UR-MCNC: SIGNIFICANT CHANGE UP MG/DL
RBC CASTS # UR COMP ASSIST: 11 /HPF — HIGH (ref 0–4)
REVIEW: SIGNIFICANT CHANGE UP
RSV RNA NPH QL NAA+NON-PROBE: SIGNIFICANT CHANGE UP
SARS-COV-2 RNA SPEC QL NAA+PROBE: SIGNIFICANT CHANGE UP
SP GR SPEC: 1.02 — SIGNIFICANT CHANGE UP (ref 1–1.03)
SQUAMOUS # UR AUTO: 5 /HPF — SIGNIFICANT CHANGE UP (ref 0–5)
UROBILINOGEN FLD QL: 1 MG/DL — SIGNIFICANT CHANGE UP (ref 0.2–1)
WBC UR QL: 2 /HPF — SIGNIFICANT CHANGE UP (ref 0–5)

## 2023-09-06 PROCEDURE — 71046 X-RAY EXAM CHEST 2 VIEWS: CPT | Mod: 26

## 2023-09-06 PROCEDURE — 99284 EMERGENCY DEPT VISIT MOD MDM: CPT

## 2023-09-06 PROCEDURE — 93010 ELECTROCARDIOGRAM REPORT: CPT

## 2023-09-06 RX ORDER — IBUPROFEN 200 MG
400 TABLET ORAL ONCE
Refills: 0 | Status: COMPLETED | OUTPATIENT
Start: 2023-09-06 | End: 2023-09-06

## 2023-09-06 RX ORDER — SODIUM CHLORIDE 9 MG/ML
1000 INJECTION INTRAMUSCULAR; INTRAVENOUS; SUBCUTANEOUS ONCE
Refills: 0 | Status: COMPLETED | OUTPATIENT
Start: 2023-09-06 | End: 2023-09-06

## 2023-09-06 RX ADMIN — SODIUM CHLORIDE 1000 MILLILITER(S): 9 INJECTION INTRAMUSCULAR; INTRAVENOUS; SUBCUTANEOUS at 20:58

## 2023-09-06 RX ADMIN — Medication 400 MILLIGRAM(S): at 20:58

## 2023-09-06 NOTE — ED ADULT TRIAGE NOTE - CHIEF COMPLAINT QUOTE
Pt with multiple complaints, c/o fever since last night. Also endorsing body aches, SOB, and was diagnosed bacterial vaginosis X 3 days ago. Has been taking antibiotic. Denies any pertinent medical Hx.

## 2023-09-06 NOTE — ED PROVIDER NOTE - PATIENT PORTAL LINK FT
You can access the FollowMyHealth Patient Portal offered by [facility] by registering at the following website: [email or web address]. By joining [employer]’s FollowMyHealth portal, you will also be able to view your health information using other applications (apps) compatible with our system.

## 2023-09-06 NOTE — ED PROVIDER NOTE - OBJECTIVE STATEMENT
23yoF no PMH p/w fevers max temp 101.4F, myalgias, frontal headaches, 23yoF no PMH p/w fevers max temp 101.4F, myalgias, frontal headaches, dry cough, and mild shortness of breath over past 3 days. no known sick contacts. no n/v/d/c, dysuria, vision changes, weakness, chest pain. recently treated for bacterial vaginosis by her OBGYN which she started flagyl for 3 days ago and admits to feeling significantly better. no vaginal bleeding, or discharge. pt fully vaccinated for COVID

## 2023-09-06 NOTE — ED ADULT NURSE NOTE - NSFALLUNIVINTERV_ED_ALL_ED
Bed/Stretcher in lowest position, wheels locked, appropriate side rails in place/Call bell, personal items and telephone in reach/Instruct patient to call for assistance before getting out of bed/chair/stretcher/Non-slip footwear applied when patient is off stretcher/[place] to call system/Physically safe environment - no spills, clutter or unnecessary equipment/Purposeful proactive rounding/Room/bathroom lighting operational, light cord in reach

## 2023-09-06 NOTE — ED PROVIDER NOTE - CLINICAL SUMMARY MEDICAL DECISION MAKING FREE TEXT BOX
23yoF no PMH p/w fevers max temp 101.4F, myalgias, frontal headaches, dry cough, and mild shortness of breath over past 3 days. no known sick contacts. no n/v/d/c, dysuria, vision changes, weakness, chest pain. recently treated for bacterial vaginosis by her OBGYN which she started flagyl for 3 days ago and admits to feeling significantly better. no vaginal bleeding, or discharge. pt fully vaccinated for COVID    suspect covid, flu or viral syndrome  will check CXR given SOB, although low suspicion for PNA  will check urine to r/o UTI as pt admtis to some mild low back discomfort/aches.  will give fluids/ibuprofen as needed and reassess

## 2023-09-06 NOTE — ED ADULT NURSE NOTE - OBJECTIVE STATEMENT
pt received in intake room 5. pt is AAOX4 and ambulatory. pt c/o fever and cough since yesterday. pt report being swabbed at urgent care with negative result for covid. pt denies chest pain, SOB, N.V.D. pt reports dysuria and pelvic pain. pt dx with bacterial vaginosis and is taking medication for it currently. pt denies PMH. pt RR are even and unlabored. pt has IV placed in RAC, medicated as ordered, urine and swab sent. Call bell within reach,. Safety maintained.

## 2023-09-06 NOTE — ED ADULT NURSE NOTE - TEMPLATE
Comments:     Last Office Visit (last PCP visit):   Visit date not found    Next Visit Date:  No future appointments. **If hasn't been seen in over a year OR hasn't followed up according to last diabetes/ADHD visit, make appointment for patient before sending refill to provider.     Rx requested:  Requested Prescriptions     Pending Prescriptions Disp Refills    insulin aspart (NOVOLOG) 100 UNIT/ML injection vial [Pharmacy Med Name: NOVOLOG 100 UNIT/ML VIAL] 10 mL 1     Sig: inject 12 units subcutaneously three times a day before meals
General

## 2023-09-06 NOTE — ED PROVIDER NOTE - NSFOLLOWUPINSTRUCTIONS_ED_ALL_ED_FT
You were seen in our department for viral syndrome  Follow up with your PMD in 48-72 hours for further monitoring.  if you develop any chest pain, dizziness, high fevers, weakness, numbness, tingling, vision changes, or any worsening symptoms return to our ED for evaluation.    Upper Respiratory Infection, Adult    An upper respiratory infection (URI) affects the nose, throat, and upper air passages. URIs are caused by germs (viruses). The most common type of URI is often called "the common cold."    Medicines cannot cure URIs, but you can do things at home to relieve your symptoms. URIs usually get better within 7–10 days.    Follow these instructions at home:  Activity    Rest as needed.  If you have a fever, stay home from work or school until your fever is gone, or until your doctor says you may return to work or school.  You should stay home until you cannot spread the infection anymore (you are not contagious).  Your doctor may have you wear a face mask so you have less risk of spreading the infection.    Relieving symptoms    Gargle with a salt-water mixture 3–4 times a day or as needed. To make a salt-water mixture, completely dissolve ½–1 tsp of salt in 1 cup of warm water.  Use a cool-mist humidifier to add moisture to the air. This can help you breathe more easily.    Eating and drinking    Drink enough fluid to keep your pee (urine) pale yellow.  Eat soups and other clear broths.     General instructions    Take over-the-counter and prescription medicines only as told by your doctor. These include cold medicines, fever reducers, and cough suppressants.  Do not use any products that contain nicotine or tobacco. These include cigarettes and e-cigarettes. If you need help quitting, ask your doctor.  Avoid being where people are smoking (avoid secondhand smoke).  Make sure you get regular shots and get the flu shot every year.  Keep all follow-up visits as told by your doctor. This is important.     How to avoid spreading infection to others    Wash your hands often with soap and water. If you do not have soap and water, use hand .  Avoid touching your mouth, face, eyes, or nose.  Cough or sneeze into a tissue or your sleeve or elbow. Do not cough or sneeze into your hand or into the air.     Contact a doctor if:  You are getting worse, not better.  You have any of these:  A fever.  Chills.  Brown or red mucus in your nose.  Yellow or brown fluid (discharge)coming from your nose.  Pain in your face, especially when you bend forward.  Swollen neck glands.  Pain with swallowing.  White areas in the back of your throat.    Get help right away if:  You have shortness of breath that gets worse.  You have very bad or constant:  Headache.  Ear pain.  Pain in your forehead, behind your eyes, and over your cheekbones (sinus pain).  Chest pain.  You have long-lasting (chronic) lung disease along with any of these:  Wheezing.  Long-lasting cough.  Coughing up blood.  A change in your usual mucus.  You have a stiff neck.  You have changes in your:  Vision.  Hearing.  Thinking.  Mood.    Summary  An upper respiratory infection (URI) is caused by a germ called a virus. The most common type of URI is often called "the common cold."  URIs usually get better within 7–10 days.  Take over-the-counter and prescription medicines only as told by your doctor.    ADDITIONAL NOTES AND INSTRUCTIONS    Please follow up with your Primary MD in 24-48 hr.  Seek immediate medical care for any new/worsening signs or symptoms.     Infección de las vías respiratorias superiores, en adultos    Daphne infección de las vías respiratorias superiores (URI) afecta la nariz, la garganta y las vías respiratorias superiores. Los URI son causados ??por gérmenes (virus). El tipo más común de URI a menudo se denomina "resfriado común".    Los medicamentos no pueden curar las infecciones respiratorias superiores, david puede hacer cosas en casa para aliviar ale síntomas. Las URI generalmente mejoran en 7 a 10 días.    Siga estas instrucciones en casa:  Actividad    Descanse según sea necesario.  Si tiene fiebre, quédese en casa y no vaya al trabajo ni a la escuela hasta que le desaparezca la fiebre o hasta que bingham médico le diga que puede regresar al trabajo o la escuela.  Debe quedarse en casa hasta que ya no pueda propagar la infección (ya no es contagioso).  Es posible que bingham médico le pida que use daphne mascarilla para que tenga menos riesgo de propagar la infección.    Aliviar los síntomas    Christian gárgaras con daphne mezcla de agua salada de 3 a 4 veces al día o según sea necesario. Para hacer daphne mezcla de agua salada, disuelva completamente entre ½ y 1 cucharadita de sal en 1 taza de agua tibia.  Use un humidificador de vapor frío para agregar humedad al aire. Fajardo puede ayudarlo a respirar más fácilmente.    Comiendo y bebiendo    Nancy suficiente líquido para mantener bingham pipí (orina) de color amarillo pálido.  Come sopas y otros caldos soumya.    Instrucciones generales    Welty medicamentos de venta bong y recetados solo según las indicaciones de bingham médico. Estos incluyen medicamentos para el resfriado, antifebriles y antitusígenos.  No use ningún producto que contenga nicotina o tabaco. Estos incluyen cigarrillos y cigarrillos electrónicos. Si necesita ayuda para dejar de fumar, consulte con bingham médico.  Evite estar donde la gente fuma (evite el humo de segunda mano).  Asegúrese de recibir vacunas regulares y vacunarse contra la gripe todos los años.  Asista a todas las visitas de seguimiento que le indique bingham médico. Fajardo es importante.    Cómo evitar transmitir la infección a otras personas    Lávese las aminta frecuentemente con agua y jabón. Si no tiene agua y jabón, use desinfectante para aminta.  Evite tocarse la boca, la gilbert, los ojos o la nariz.  Tosa o estornude en un pañuelo de papel, en la manga o en el codo. No tosa ni estornude en bingham mano o en el aire.    Comuníquese con un médico si:  Estás empeorando, no mejorando.  Tienes alguno de estos:  Fiebre  Escalofríos.  Moco marrón o juan en la nariz.  Fluido (secreción) amarillo o marrón que sale de la nariz.  Dolor en la gilbert, especialmente cuando se inclina hacia adelante.  Glándulas del bert hinchadas.  Dolor al tragar.  Áreas lux en la parte posterior de la garganta.    Obtenga ayuda de inmediato si:  Tiene dificultad para respirar que empeora.  Tienes muy ritu o alicia:  Dolor de roderick.  Dolor de oído.  Dolor en la frente, detrás de los ojos y sobre los pómulos (dolor de los senos nasales).  Dolor en el pecho.  Tiene daphne enfermedad pulmonar prolongada (crónica) junto con cualquiera de los siguientes:  Sibilancias.  Tos de larga duración.  Tosiendo kassi.  Un cambio en bingham moco habitual.  Tienes rigidez en el bert.  Tiene cambios en bingham:  Visión.  Escuchando.  Pensando.  Estado animico.    Resumen  Daphne infección de las vías respiratorias superiores (URI) es causada por un germen llamado virus. El tipo más común de URI a menudo se denomina "resfriado común".  Las URI generalmente mejoran en 7 a 10 días.  Welty medicamentos de venta bong y recetados solo según las indicaciones de bingham médico.    NOTAS E INSTRUCCIONES ADICIONALES    Christian un seguimiento con bingham médico de cabecera en 24-48 horas.  Busque atención médica inmediata ante cualquier signo o síntoma nuevo o que empeore.

## 2023-09-06 NOTE — ED PROVIDER NOTE - PHYSICAL EXAMINATION
CONSTITUTIONAL: Well-appearing; well-nourished; in no apparent distress;  HEAD: Normocephalic, atraumatic;  EYES: PERRL, conjunctiva and sclera WNL;  ENT: normal nose; no rhinorrhea; unremarkable pharynx  NECK/LYMPH: Supple  CARD: Normal S1, S2; no murmurs, rubs, or gallops noted  RESP: Normal chest excursion with respiration; breath sounds clear and equal bilaterally; no wheezes, rhonchi, or rales noted  ABD/GI: soft, non-distended; non-tender; no palpable organomegaly, no pulsatile mass  EXT/MS: moves all extremities; distal pulses are normal, no pedal edema  SKIN: Normal for age and race; warm; dry; good turgor; no apparent lesions or exudate noted  NEURO: Awake, alert, oriented x 3, no gross deficits  PSYCH: Normal mood; appropriate affect

## 2023-11-22 ENCOUNTER — EMERGENCY (EMERGENCY)
Facility: HOSPITAL | Age: 23
LOS: 1 days | Discharge: HOME | End: 2023-11-22
Admitting: EMERGENCY MEDICINE
Payer: MEDICAID

## 2023-11-22 VITALS
DIASTOLIC BLOOD PRESSURE: 71 MMHG | OXYGEN SATURATION: 100 % | SYSTOLIC BLOOD PRESSURE: 131 MMHG | TEMPERATURE: 98 F | RESPIRATION RATE: 18 BRPM | HEART RATE: 78 BPM

## 2023-11-22 PROCEDURE — 99283 EMERGENCY DEPT VISIT LOW MDM: CPT

## 2023-11-22 NOTE — ED ADULT TRIAGE NOTE - CHIEF COMPLAINT QUOTE
Pt reporting to the ED for lower abdominal pain starting ~ 1 hour due for menses tomorrow. LMP last month. Reports nausea. no pmh

## 2023-11-23 NOTE — ED PROVIDER NOTE - OBJECTIVE STATEMENT
Pt is a 22 YO F with no PMH who presented to ED with abdominal pain. Pt reports she had a sharp pain in her lower stomach which felt like a gas pain. Pt reports she has not been having normal bowel movements the past week and has felt constipated. Pt reports while waiting in the waiting room she had a bowel movement and now her pain has resolved. Denies fevers, chills, nausea, vomiting, diarrhea.

## 2023-11-23 NOTE — ED PROVIDER NOTE - CLINICAL SUMMARY MEDICAL DECISION MAKING FREE TEXT BOX
Pt is a 22 YO F with no PMH who presented to ED with abdominal pain which resolved after having a BM. Pt abdomen soft, non tender, non distended on exam, sleeping comfortably in room. Pt advised to trial miralax, gas x and given GI follow up.

## 2023-11-23 NOTE — ED PROVIDER NOTE - PATIENT PORTAL LINK FT
You can access the FollowMyHealth Patient Portal offered by [facility] by registering at the following website: [email or web address]. By joining [employer]’s FollowMyHealth portal, you will also be able to view your health information using other applications (apps) compatible with our system.

## 2023-11-23 NOTE — ED PROVIDER NOTE - NSFOLLOWUPINSTRUCTIONS_ED_ALL_ED_FT
Take Miralax daily.     Follow up with primary care in 24-48 hours. Stick to bland diet including toast, rice, applesauce, soup. Drink plenty of clear fluids. Follow up with GI. Return to ER if any worsening symptoms, abdominal pain, nausea, vomiting, unable to eat or any other concerns.

## 2024-04-23 NOTE — ED ADULT TRIAGE NOTE - HEART RATE (BEATS/MIN)
Increase dose of coreg from 12.5 mg BID to 25 mg BID    Call back with update on BP and HR in 1 week    110

## 2025-07-28 ENCOUNTER — APPOINTMENT (OUTPATIENT)
Dept: OBGYN | Facility: CLINIC | Age: 25
End: 2025-07-28
Payer: COMMERCIAL

## 2025-07-28 PROCEDURE — 99385 PREV VISIT NEW AGE 18-39: CPT

## 2025-07-28 PROCEDURE — 81002 URINALYSIS NONAUTO W/O SCOPE: CPT

## 2025-07-28 PROCEDURE — 76830 TRANSVAGINAL US NON-OB: CPT

## 2025-07-28 PROCEDURE — 99459 PELVIC EXAMINATION: CPT
